# Patient Record
Sex: MALE | Race: ASIAN | URBAN - METROPOLITAN AREA
[De-identification: names, ages, dates, MRNs, and addresses within clinical notes are randomized per-mention and may not be internally consistent; named-entity substitution may affect disease eponyms.]

---

## 2020-04-08 ENCOUNTER — INPATIENT (INPATIENT)
Facility: HOSPITAL | Age: 75
LOS: 6 days | Discharge: HOME HEALTH | End: 2020-04-15
Attending: INTERNAL MEDICINE | Admitting: INTERNAL MEDICINE
Payer: MEDICARE

## 2020-04-08 VITALS
TEMPERATURE: 98 F | SYSTOLIC BLOOD PRESSURE: 91 MMHG | RESPIRATION RATE: 22 BRPM | OXYGEN SATURATION: 88 % | DIASTOLIC BLOOD PRESSURE: 63 MMHG | HEART RATE: 98 BPM

## 2020-04-08 DIAGNOSIS — R74.0 NONSPECIFIC ELEVATION OF LEVELS OF TRANSAMINASE AND LACTIC ACID DEHYDROGENASE [LDH]: ICD-10-CM

## 2020-04-08 LAB
ALBUMIN SERPL ELPH-MCNC: 3.7 G/DL — SIGNIFICANT CHANGE UP (ref 3.5–5.2)
ALP SERPL-CCNC: 42 U/L — SIGNIFICANT CHANGE UP (ref 30–115)
ALT FLD-CCNC: 41 U/L — SIGNIFICANT CHANGE UP (ref 0–41)
ANION GAP SERPL CALC-SCNC: 14 MMOL/L — SIGNIFICANT CHANGE UP (ref 7–14)
AST SERPL-CCNC: 59 U/L — HIGH (ref 0–41)
BASOPHILS # BLD AUTO: 0 K/UL — SIGNIFICANT CHANGE UP (ref 0–0.2)
BASOPHILS NFR BLD AUTO: 0 % — SIGNIFICANT CHANGE UP (ref 0–1)
BILIRUB SERPL-MCNC: 1.3 MG/DL — HIGH (ref 0.2–1.2)
BUN SERPL-MCNC: 23 MG/DL — HIGH (ref 10–20)
CALCIUM SERPL-MCNC: 8.1 MG/DL — LOW (ref 8.5–10.1)
CHLORIDE SERPL-SCNC: 80 MMOL/L — LOW (ref 98–110)
CO2 SERPL-SCNC: 28 MMOL/L — SIGNIFICANT CHANGE UP (ref 17–32)
CREAT SERPL-MCNC: 0.8 MG/DL — SIGNIFICANT CHANGE UP (ref 0.7–1.5)
D DIMER BLD IA.RAPID-MCNC: 229 NG/ML DDU — SIGNIFICANT CHANGE UP (ref 0–230)
EOSINOPHIL # BLD AUTO: 0 K/UL — SIGNIFICANT CHANGE UP (ref 0–0.7)
EOSINOPHIL NFR BLD AUTO: 0 % — SIGNIFICANT CHANGE UP (ref 0–8)
GLUCOSE SERPL-MCNC: 151 MG/DL — HIGH (ref 70–99)
HCT VFR BLD CALC: 39.5 % — LOW (ref 42–52)
HGB BLD-MCNC: 14 G/DL — SIGNIFICANT CHANGE UP (ref 14–18)
IMM GRANULOCYTES NFR BLD AUTO: 0.4 % — HIGH (ref 0.1–0.3)
INR BLD: 1.14 RATIO — SIGNIFICANT CHANGE UP (ref 0.65–1.3)
LDH SERPL L TO P-CCNC: 402 U/L — HIGH (ref 50–242)
LYMPHOCYTES # BLD AUTO: 0.26 K/UL — LOW (ref 1.2–3.4)
LYMPHOCYTES # BLD AUTO: 5.7 % — LOW (ref 20.5–51.1)
MCHC RBC-ENTMCNC: 32.6 PG — HIGH (ref 27–31)
MCHC RBC-ENTMCNC: 35.4 G/DL — SIGNIFICANT CHANGE UP (ref 32–37)
MCV RBC AUTO: 92.1 FL — SIGNIFICANT CHANGE UP (ref 80–94)
MONOCYTES # BLD AUTO: 0.47 K/UL — SIGNIFICANT CHANGE UP (ref 0.1–0.6)
MONOCYTES NFR BLD AUTO: 10.2 % — HIGH (ref 1.7–9.3)
NEUTROPHILS # BLD AUTO: 3.84 K/UL — SIGNIFICANT CHANGE UP (ref 1.4–6.5)
NEUTROPHILS NFR BLD AUTO: 83.7 % — HIGH (ref 42.2–75.2)
NRBC # BLD: 0 /100 WBCS — SIGNIFICANT CHANGE UP (ref 0–0)
PLATELET # BLD AUTO: 146 K/UL — SIGNIFICANT CHANGE UP (ref 130–400)
POTASSIUM SERPL-MCNC: 3.3 MMOL/L — LOW (ref 3.5–5)
POTASSIUM SERPL-SCNC: 3.3 MMOL/L — LOW (ref 3.5–5)
PROT SERPL-MCNC: 6.5 G/DL — SIGNIFICANT CHANGE UP (ref 6–8)
PROTHROM AB SERPL-ACNC: 13.1 SEC — HIGH (ref 9.95–12.87)
RBC # BLD: 4.29 M/UL — LOW (ref 4.7–6.1)
RBC # FLD: 11 % — LOW (ref 11.5–14.5)
SODIUM SERPL-SCNC: 122 MMOL/L — LOW (ref 135–146)
TROPONIN T SERPL-MCNC: <0.01 NG/ML — SIGNIFICANT CHANGE UP
WBC # BLD: 4.59 K/UL — LOW (ref 4.8–10.8)
WBC # FLD AUTO: 4.59 K/UL — LOW (ref 4.8–10.8)

## 2020-04-08 PROCEDURE — 99223 1ST HOSP IP/OBS HIGH 75: CPT | Mod: AI

## 2020-04-08 PROCEDURE — 93010 ELECTROCARDIOGRAM REPORT: CPT | Mod: 77

## 2020-04-08 PROCEDURE — 71045 X-RAY EXAM CHEST 1 VIEW: CPT | Mod: 26

## 2020-04-08 PROCEDURE — 70450 CT HEAD/BRAIN W/O DYE: CPT | Mod: 26

## 2020-04-08 PROCEDURE — 93010 ELECTROCARDIOGRAM REPORT: CPT

## 2020-04-08 PROCEDURE — 99285 EMERGENCY DEPT VISIT HI MDM: CPT

## 2020-04-08 RX ORDER — SODIUM CHLORIDE 9 MG/ML
1000 INJECTION INTRAMUSCULAR; INTRAVENOUS; SUBCUTANEOUS ONCE
Refills: 0 | Status: COMPLETED | OUTPATIENT
Start: 2020-04-08 | End: 2020-04-08

## 2020-04-08 RX ORDER — ACETAMINOPHEN 500 MG
650 TABLET ORAL EVERY 6 HOURS
Refills: 0 | Status: DISCONTINUED | OUTPATIENT
Start: 2020-04-08 | End: 2020-04-15

## 2020-04-08 RX ORDER — POTASSIUM CHLORIDE 20 MEQ
20 PACKET (EA) ORAL
Refills: 0 | Status: COMPLETED | OUTPATIENT
Start: 2020-04-08 | End: 2020-04-08

## 2020-04-08 RX ORDER — ENOXAPARIN SODIUM 100 MG/ML
40 INJECTION SUBCUTANEOUS AT BEDTIME
Refills: 0 | Status: DISCONTINUED | OUTPATIENT
Start: 2020-04-08 | End: 2020-04-15

## 2020-04-08 RX ORDER — SODIUM CHLORIDE 9 MG/ML
1000 INJECTION INTRAMUSCULAR; INTRAVENOUS; SUBCUTANEOUS
Refills: 0 | Status: DISCONTINUED | OUTPATIENT
Start: 2020-04-08 | End: 2020-04-15

## 2020-04-08 RX ORDER — POTASSIUM CHLORIDE 20 MEQ
40 PACKET (EA) ORAL ONCE
Refills: 0 | Status: COMPLETED | OUTPATIENT
Start: 2020-04-08 | End: 2020-04-08

## 2020-04-08 RX ORDER — SODIUM CHLORIDE 9 MG/ML
1000 INJECTION, SOLUTION INTRAVENOUS ONCE
Refills: 0 | Status: COMPLETED | OUTPATIENT
Start: 2020-04-08 | End: 2020-04-08

## 2020-04-08 RX ORDER — LOSARTAN POTASSIUM 100 MG/1
1 TABLET, FILM COATED ORAL
Qty: 0 | Refills: 0 | DISCHARGE

## 2020-04-08 RX ORDER — PANTOPRAZOLE SODIUM 20 MG/1
40 TABLET, DELAYED RELEASE ORAL
Refills: 0 | Status: DISCONTINUED | OUTPATIENT
Start: 2020-04-08 | End: 2020-04-15

## 2020-04-08 RX ADMIN — SODIUM CHLORIDE 1000 MILLILITER(S): 9 INJECTION, SOLUTION INTRAVENOUS at 12:00

## 2020-04-08 RX ADMIN — Medication 40 MILLIEQUIVALENT(S): at 14:13

## 2020-04-08 RX ADMIN — Medication 20 MILLIEQUIVALENT(S): at 22:20

## 2020-04-08 RX ADMIN — SODIUM CHLORIDE 1000 MILLILITER(S): 9 INJECTION INTRAMUSCULAR; INTRAVENOUS; SUBCUTANEOUS at 14:13

## 2020-04-08 RX ADMIN — Medication 650 MILLIGRAM(S): at 17:52

## 2020-04-08 RX ADMIN — SODIUM CHLORIDE 100 MILLILITER(S): 9 INJECTION INTRAMUSCULAR; INTRAVENOUS; SUBCUTANEOUS at 17:52

## 2020-04-08 RX ADMIN — Medication 20 MILLIEQUIVALENT(S): at 17:52

## 2020-04-08 RX ADMIN — ENOXAPARIN SODIUM 40 MILLIGRAM(S): 100 INJECTION SUBCUTANEOUS at 23:48

## 2020-04-08 NOTE — ED PROVIDER NOTE - CARE PLAN
Principal Discharge DX:	Syncope and collapse  Secondary Diagnosis:	Hypoxia  Secondary Diagnosis:	Malnutrition
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
## 2020-04-08 NOTE — ED PROVIDER NOTE - CLINICAL SUMMARY MEDICAL DECISION MAKING FREE TEXT BOX
Attending Note:   73 yo M presents c/o syncope, decreased PO intake x2 weeks along with cough, SOB and concern for COVID. Physical-nad,perrl,mmm,rrr,ctab,abd softntnd,fromx4,anox3. Initial VS within normal limits, mild tachypnea at 22, sat 88% on room air. Review of labs noted to have lymphocytopenia, mildly elevated transaminitis, CXR with R middle love opacity, EKG with R bundle branch block. Will admit for syncope. Eval and monitor for hypoxia for COVID, placed on O2 2L on nasal cannula. Currently not tachypneic or on respiratory distress. Will admit to the floor.

## 2020-04-08 NOTE — H&P ADULT - HISTORY OF PRESENT ILLNESS
Patient is a 74 year old Male with PMH HTN comes in with Decreased PO intake and Syncope with fall. Patient reports two weeks of Decreased PO intake due to Decreased Appetite. Patient reports that he tries to eat things like oatmeal and banana but he cant finish it due to no appetite. He repots Malaise, weakness, and loss of weight, unable to quantify. this morning he was walking to bathroom, fell, and cut his head, not sure how +LOC. Patient reports associated fevers of "107 degrees" but that has since resolved.  He denies any Abdominal pain, Nausea, Vomiting. he does report Loose stools and attributes it to not eating much. He reports no blood, but Reports "Some Black dots" in his loose stool. No change in Urination.  He reports that his doctor prescribed Robitussin, but he has since stopped that medication.   The patient reports that he has had a very mild cough. He reports his wife has been coughing a lot, but his is only mild.    Patient reports no Travel History, And only possible sick contact with wife.     In ED PT was Afebrile, Hypotensive, and Tachycardic - Treated with 2 L IV NSS.

## 2020-04-08 NOTE — H&P ADULT - NSHPLABSRESULTS_GEN_ALL_CORE
14.0   4.59  )-----------( 146      ( 08 Apr 2020 11:38 )             39.5     Auto Lymphocyte # : 0.26 K/uL    04-08    122<L>  |  80<L>  |  23<H>  ----------------------------<  151<H>  3.3<L>   |  28  |  0.8    Ca    8.1<L>      08 Apr 2020 11:38    TPro  6.5  /  Alb  3.7  /  TBili  1.3<H>  /  DBili  x   /  AST  59<H>  /  ALT  41  /  AlkPhos  42  04-08    Lactate Dehydrogenase, Serum (04.08.20 @ 11:38)    Lactate Dehydrogenase, Serum: 402 U/L    CARDIAC MARKERS ( 08 Apr 2020 11:38 )  x     / <0.01 ng/mL / x     / x     / x        < from: Xray Chest 1 View-PORTABLE IMMEDIATE (04.08.20 @ 13:22) >      Impression:      Right midlung field opacification.    < end of copied text >    < from: CT Head No Cont (04.08.20 @ 12:09) >    IMPRESSION:     No evidence of acute intracranial pathology.      < end of copied text >

## 2020-04-08 NOTE — ED PROVIDER NOTE - PHYSICAL EXAMINATION
CONST: NAD  EYES: Sclera and conjunctiva clear.   ENT: Clear nasal discharge. Oropharynx normal appearing, no erythema or exudates. No abscess or swelling. Uvula midline.   NECK: Non-tender, no meningeal signs. normal ROM. supple   CARD: S1 S2; No jvd  RESP: B/L crackles. O2sat 88% ra, improve to 95% 2L nc  GI: Soft, non-tender, non-distended. no cva tenderness. normal BS  MS: Normal ROM in all extremities. pulses 2 +. no calf tenderness or swelling  SKIN: scalp abrasion  NEURO: A&Ox4, No focal deficits. Strength 5/5 with no sensory deficits. Finger to nose intact. Negative pronator drift

## 2020-04-08 NOTE — H&P ADULT - NSICDXNOPASTSURGICALHX_GEN_ALL_CORE
Problem: Fall Risk (Adult)  Goal: Identify Related Risk Factors and Signs and Symptoms  Outcome: Ongoing (interventions implemented as appropriate)    Goal: Absence of Fall  Outcome: Ongoing (interventions implemented as appropriate)      Problem: Confusion, Acute (Adult)  Goal: Identify Related Risk Factors and Signs and Symptoms  Outcome: Ongoing (interventions implemented as appropriate)    Goal: Cognitive/Functional Impairments Minimized  Outcome: Ongoing (interventions implemented as appropriate)    Goal: Safety  Outcome: Ongoing (interventions implemented as appropriate)      Problem: Patient Care Overview  Goal: Plan of Care Review  Outcome: Ongoing (interventions implemented as appropriate)   07/16/19 0644   Coping/Psychosocial   Plan of Care Reviewed With patient   Plan of Care Review   Progress no change   OTHER   Outcome Summary VSS, except for increased bp at times. No c/o pain voiced. No hallucinations/seizure activity noted. Pt is alert, w/confusion noted, became more focused and alert as the shift progressed. Pt is impulsive at times, did not put SCD on, instructed on ankle pumps. Safety maintained.       Problem: Skin Injury Risk (Adult)  Goal: Identify Related Risk Factors and Signs and Symptoms  Outcome: Ongoing (interventions implemented as appropriate)    Goal: Skin Health and Integrity  Outcome: Ongoing (interventions implemented as appropriate)         <-- Click to add NO significant Past Surgical History

## 2020-04-08 NOTE — H&P ADULT - ASSESSMENT
Patient is a 74 year old Male with PMH HTN comes in with Decreased PO intake and Syncope with fall. Patient reports 2 weeks no Appetite, this morning fell while walking to bathroom.     # Syncope - Likely due to hypovolemia secondary to Decreased PO intake likely secondary to Viral illness  - Afebrile, Hypotensive, Tachycardic  - No Chest pain, Palpitations, Cardiac history - Negative Trops  - EKG pending  - Na - 122 K 3.3  - CTH - Negative  - S/P 2 L Fluid - Continue with NSS @ 100  - Consider GI work up - Pending COVID status if patient does not improve -    # Cough   - Lab values with CXR- opacification R. Lung field and possible sick contact  - COVID swabbed     # Hyponatremia - NSS @ 100  # Hypokalemia - Replete 20 mg 3 doses   # HTN - Currently Hypotensive - Hold Losartan (Did not know his pharmacy - Somewhere in [place])    Diet: DASH  DVT ppx: Lovenox 40   GI ppx: Protonix - Given Protonix for Decreased Appetite   Activity: Ambulate as tolerated  Dispo: Acute; Pending Covid swab; Consider GI work up   FULL CODE
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
## 2020-04-08 NOTE — H&P ADULT - NSHPPHYSICALEXAM_GEN_ALL_CORE
VITAL SIGNS: AFebrile, vital signs stable  CONSTITUTIONAL: Well-developed; slightly Cachetic  SKIN: Skin exam is warm and dry, no acute rash.  HEAD: Normocephalic; Superficial Laceration over right Parietal Bone  EYES: Pupils equal round reactive to light, Extraocular movements intact; conjunctiva and sclera clear. No Icterus  NECK: Supple; non tender.  CARD:  Normal S1, S2; Regular rate and rhythm. no murmurs, gallops, or rubs. Appreciated  RESP: Lungs clear to auscultation bilaterally. No wheezes, rales or rhonchi.  ABD: Abdomen soft; non-tender, even on Deep palpation; non-distended;  no hepatosplenomegaly. No costovertebral angle tenderness. NO MASS APPRECIATED  EXT: Normal ROM. No clubbing, cyanosis or edema. No calf tenderness to palpation.  NEURO: Alert and oriented x 3. No focal deficits.  PSYCH: Cooperative, appropriate.

## 2020-04-08 NOTE — ED ADULT NURSE NOTE - NSIMPLEMENTINTERV_GEN_ALL_ED
Implemented All Fall Risk Interventions:  [place] to call system. Call bell, personal items and telephone within reach. Instruct patient to call for assistance. Room bathroom lighting operational. Non-slip footwear when patient is off stretcher. Physically safe environment: no spills, clutter or unnecessary equipment. Stretcher in lowest position, wheels locked, appropriate side rails in place. Provide visual cue, wrist band, yellow gown, etc. Monitor gait and stability. Monitor for mental status changes and reorient to person, place, and time. Review medications for side effects contributing to fall risk. Reinforce activity limits and safety measures with patient and family.

## 2020-04-08 NOTE — ED PROVIDER NOTE - NS ED ROS FT
Constitutional: (+) fever  Eyes/ENT: (-) blurry vision, (-) epistaxis  Cardiovascular: (+) syncope, (-) cp  Respiratory: (+) cough, (+) shortness of breath  Gastrointestinal: (-) vomiting, (-) diarrhea  : (-) dysuria, (-) hematuria  Musculoskeletal: (-) neck pain, (-) back pain, (-) joint pain  Integumentary: (-) rash, (-) edema  Neurological: (-) headache, (-) altered mental status  Allergic/Immunologic: (-) pruritus

## 2020-04-08 NOTE — H&P ADULT - NSHPREVIEWOFSYSTEMS_GEN_ALL_CORE
Review of Systems:  CONSTITUTIONAL - Fever, Chills (In legs) and Loss of weight  SKIN - No rash  EYES - No eye pain, No blurred vision  ENT - Reports no Throat pain  RESPIRATORY - Mild SOB and Mild Cough  CARDIAC -No chest pain, No palpitations  GI - Loose stools with "Black dots"  No abdominal pain, No nausea, No vomiting, No constipation, No bright red blood per rectum   - No dysuria, frequency, hematuria.   MUSCULOSKELETAL - Overall Malaise  NEUROLOGIC - Patient reports dizziness with walking. No numbness, No focal weakness, No headache,

## 2020-04-08 NOTE — H&P ADULT - NSHPSOCIALHISTORY_GEN_ALL_CORE
Patient lives at Home with Wife. (Wife is also A rule out covid in the hospital. Wife works as )    Patient Born in [facility]. Moved to [facility]. Worked in [employer]  Patient reports No family history of Malignancy only HTN    Patient reports smoking for 2 months 30 years ago,   Patient Reports no Alcohol or Illicit Drugs.     Patients reports being fully functional Prior to these events over the past 2 weeks.

## 2020-04-09 LAB
ALBUMIN SERPL ELPH-MCNC: 3.5 G/DL — SIGNIFICANT CHANGE UP (ref 3.5–5.2)
ALP SERPL-CCNC: 42 U/L — SIGNIFICANT CHANGE UP (ref 30–115)
ALT FLD-CCNC: 43 U/L — HIGH (ref 0–41)
ANION GAP SERPL CALC-SCNC: 11 MMOL/L — SIGNIFICANT CHANGE UP (ref 7–14)
AST SERPL-CCNC: 51 U/L — HIGH (ref 0–41)
BASOPHILS # BLD AUTO: 0 K/UL — SIGNIFICANT CHANGE UP (ref 0–0.2)
BASOPHILS NFR BLD AUTO: 0 % — SIGNIFICANT CHANGE UP (ref 0–1)
BILIRUB SERPL-MCNC: 1.1 MG/DL — SIGNIFICANT CHANGE UP (ref 0.2–1.2)
BUN SERPL-MCNC: 18 MG/DL — SIGNIFICANT CHANGE UP (ref 10–20)
CALCIUM SERPL-MCNC: 7.9 MG/DL — LOW (ref 8.5–10.1)
CHLORIDE SERPL-SCNC: 95 MMOL/L — LOW (ref 98–110)
CO2 SERPL-SCNC: 25 MMOL/L — SIGNIFICANT CHANGE UP (ref 17–32)
CREAT SERPL-MCNC: 0.6 MG/DL — LOW (ref 0.7–1.5)
EOSINOPHIL # BLD AUTO: 0 K/UL — SIGNIFICANT CHANGE UP (ref 0–0.7)
EOSINOPHIL NFR BLD AUTO: 0 % — SIGNIFICANT CHANGE UP (ref 0–8)
GLUCOSE SERPL-MCNC: 129 MG/DL — HIGH (ref 70–99)
HCT VFR BLD CALC: 40.1 % — LOW (ref 42–52)
HCV AB S/CO SERPL IA: 0.03 COI — SIGNIFICANT CHANGE UP
HCV AB SERPL-IMP: SIGNIFICANT CHANGE UP
HGB BLD-MCNC: 13.9 G/DL — LOW (ref 14–18)
IMM GRANULOCYTES NFR BLD AUTO: 0.5 % — HIGH (ref 0.1–0.3)
LYMPHOCYTES # BLD AUTO: 0.46 K/UL — LOW (ref 1.2–3.4)
LYMPHOCYTES # BLD AUTO: 7.6 % — LOW (ref 20.5–51.1)
MAGNESIUM SERPL-MCNC: 2.3 MG/DL — SIGNIFICANT CHANGE UP (ref 1.8–2.4)
MCHC RBC-ENTMCNC: 32.9 PG — HIGH (ref 27–31)
MCHC RBC-ENTMCNC: 34.7 G/DL — SIGNIFICANT CHANGE UP (ref 32–37)
MCV RBC AUTO: 95 FL — HIGH (ref 80–94)
MONOCYTES # BLD AUTO: 0.33 K/UL — SIGNIFICANT CHANGE UP (ref 0.1–0.6)
MONOCYTES NFR BLD AUTO: 5.4 % — SIGNIFICANT CHANGE UP (ref 1.7–9.3)
NEUTROPHILS # BLD AUTO: 5.25 K/UL — SIGNIFICANT CHANGE UP (ref 1.4–6.5)
NEUTROPHILS NFR BLD AUTO: 86.5 % — HIGH (ref 42.2–75.2)
NRBC # BLD: 0 /100 WBCS — SIGNIFICANT CHANGE UP (ref 0–0)
NT-PROBNP SERPL-SCNC: 282 PG/ML — SIGNIFICANT CHANGE UP (ref 0–300)
PLATELET # BLD AUTO: 181 K/UL — SIGNIFICANT CHANGE UP (ref 130–400)
POTASSIUM SERPL-MCNC: 3.9 MMOL/L — SIGNIFICANT CHANGE UP (ref 3.5–5)
POTASSIUM SERPL-SCNC: 3.9 MMOL/L — SIGNIFICANT CHANGE UP (ref 3.5–5)
PROT SERPL-MCNC: 6.1 G/DL — SIGNIFICANT CHANGE UP (ref 6–8)
RBC # BLD: 4.22 M/UL — LOW (ref 4.7–6.1)
RBC # FLD: 11.4 % — LOW (ref 11.5–14.5)
SARS-COV-2 RNA SPEC QL NAA+PROBE: DETECTED
SODIUM SERPL-SCNC: 131 MMOL/L — LOW (ref 135–146)
WBC # BLD: 6.07 K/UL — SIGNIFICANT CHANGE UP (ref 4.8–10.8)
WBC # FLD AUTO: 6.07 K/UL — SIGNIFICANT CHANGE UP (ref 4.8–10.8)

## 2020-04-09 PROCEDURE — 99233 SBSQ HOSP IP/OBS HIGH 50: CPT

## 2020-04-09 RX ADMIN — PANTOPRAZOLE SODIUM 40 MILLIGRAM(S): 20 TABLET, DELAYED RELEASE ORAL at 06:08

## 2020-04-09 RX ADMIN — ENOXAPARIN SODIUM 40 MILLIGRAM(S): 100 INJECTION SUBCUTANEOUS at 22:35

## 2020-04-09 NOTE — PROGRESS NOTE ADULT - ASSESSMENT
a/p:  #syncope- 2/2 dehydration/hypovolemia- +Covid viral illness  -CT head negative  -COVID19+   -monitor bmp (Na 131 today)--continue IVF and if continues to have good oral intake can stop ivf  -check orthostatics  -continue isolation precautions  -trend procalcitonin, crp and ddimer---all pending from this morning  -monitor o2 sat---currently no oxygen needs and o2sat 96-97% on RA  -monitor wbc and fever curve    #HTN  -bp stable off anti-hypertensives  -monitor bp---depending on orthostatic changes if negative will restart    #DVT/GI ppx  guarded prognosis    #Progress Note Handoff  Disposition: Home when clinically improved (lives with wife and family however wife is also admitted at [facility])

## 2020-04-09 NOTE — PROGRESS NOTE ADULT - SUBJECTIVE AND OBJECTIVE BOX
Patient is a 74y old  Male who presents with a chief complaint of Syncope, FAll (09 Apr 2020 09:47)    HPI:  Patient is a 74 year old Male with PMH HTN comes in with Decreased PO intake and Syncope with fall. Patient reports two weeks of Decreased PO intake due to Decreased Appetite. Patient reports that he tries to eat things like oatmeal and banana but he cant finish it due to no appetite. He repots Malaise, weakness, and loss of weight, unable to quantify. this morning he was walking to bathroom, fell, and cut his head, not sure how +LOC. Patient reports associated fevers of "107 degrees" but that has since resolved.  He denies any Abdominal pain, Nausea, Vomiting. he does report Loose stools and attributes it to not eating much. He reports no blood, but Reports "Some Black dots" in his loose stool. No change in Urination.  He reports that his doctor prescribed Robitussin, but he has since stopped that medication.   The patient reports that he has had a very mild cough. He reports his wife has been coughing a lot, but his is only mild.    Patient reports no Travel History, And only possible sick contact with wife.     In ED PT was Afebrile, Hypotensive, and Tachycardic - Treated with 2 L IV NSS. (08 Apr 2020 15:02)    PAST MEDICAL & SURGICAL HISTORY:  Hypertension, unspecified type  No significant past surgical history    patient seen and examined independently on morning rounds for the first time today, chart reviewed and discussed with the medicine resident    no overnight events--ate breakfast this morning- covid +    Vital Signs Last 24 Hrs  T(C): 37.4 (09 Apr 2020 06:20), Max: 38.4 (08 Apr 2020 17:44)  T(F): 99.3 (09 Apr 2020 06:20), Max: 101.2 (08 Apr 2020 17:44)  HR: 81 (09 Apr 2020 06:20) (72 - 115)  BP: 118/69 (09 Apr 2020 06:20) (98/66 - 133/77)  BP(mean): --  RR: 18 (08 Apr 2020 21:00) (18 - 18)  SpO2: 97% (08 Apr 2020 20:40) (92% - 97%)             PE:  GEN-NAD, AAOx3  PULM- Clear to auscultation bilaterally, fair air entry  CVS- +s1/s2 RRR   GI- soft NT ND +bs, no rebound, no guarding  EXT- no edema               13.9   6.07  )-----------( 181      ( 09 Apr 2020 06:53 )             40.1     04-09    131<L>  |  95<L>  |  18  ----------------------------<  129<H>  3.9   |  25  |  0.6<L>    Ca    7.9<L>      09 Apr 2020 06:53  Mg     2.3     04-09    TPro  6.1  /  Alb  3.5  /  TBili  1.1  /  DBili  x   /  AST  51<H>  /  ALT  43<H>  /  AlkPhos  42  04-09    CARDIAC MARKERS ( 08 Apr 2020 11:38 )  x     / <0.01 ng/mL / x     / x     / x                MEDICATIONS  (STANDING):  enoxaparin Injectable 40 milliGRAM(s) SubCutaneous at bedtime  pantoprazole    Tablet 40 milliGRAM(s) Oral before breakfast  sodium chloride 0.9%. 1000 milliLiter(s) (100 mL/Hr) IV Continuous <Continuous>

## 2020-04-09 NOTE — PROGRESS NOTE ADULT - SUBJECTIVE AND OBJECTIVE BOX
24H events:    Patient is a 74y old Male who presents with a chief complaint of Syncope, FAll (08 Apr 2020 15:02)    Primary diagnosis of Syncope and collapse     Today is hospital day 1d.     PAST MEDICAL & SURGICAL HISTORY  Hypertension, unspecified type  No significant past surgical history    SOCIAL HISTORY:  Negative for smoking/alcohol/drug use.     ALLERGIES:  No Known Allergies    MEDICATIONS:  STANDING MEDICATIONS  enoxaparin Injectable 40 milliGRAM(s) SubCutaneous at bedtime  pantoprazole    Tablet 40 milliGRAM(s) Oral before breakfast  sodium chloride 0.9%. 1000 milliLiter(s) IV Continuous <Continuous>    PRN MEDICATIONS  acetaminophen   Tablet .. 650 milliGRAM(s) Oral every 6 hours PRN    VITALS:   T(F): 99.3  HR: 81  BP: 118/69  RR: 18  SpO2: 97%    LABS:                        13.9   6.07  )-----------( 181      ( 09 Apr 2020 06:53 )             40.1     04-09    131<L>  |  95<L>  |  18  ----------------------------<  129<H>  3.9   |  25  |  0.6<L>    Ca    7.9<L>      09 Apr 2020 06:53  Mg     2.3     04-09    TPro  6.1  /  Alb  3.5  /  TBili  1.1  /  DBili  x   /  AST  51<H>  /  ALT  43<H>  /  AlkPhos  42  04-09    PT/INR - ( 08 Apr 2020 11:38 )   PT: 13.10 sec;   INR: 1.14 ratio               Troponin T, Serum: <0.01 ng/mL (04-08-20 @ 11:38)      CARDIAC MARKERS ( 08 Apr 2020 11:38 )  x     / <0.01 ng/mL / x     / x     / x            PHYSICAL EXAM:  CONSTITUTIONAL: Well-developed; slightly Cachetic  SKIN: Skin exam is warm and dry, no acute rash.  HEAD: Normocephalic; Superficial Laceration over right Parietal Bone  EYES: Pupils equal round reactive to light, Extraocular movements intact; conjunctiva and sclera clear. No Icterus  NECK: Supple; non tender.  CARD:  Normal S1, S2; Regular rate and rhythm. no murmurs, gallops, or rubs. Appreciated  RESP: Lungs clear to auscultation bilaterally. No wheezes, rales or rhonchi.  ABD: Abdomen soft; non-tender, even on Deep palpation; non-distended;  no hepatosplenomegaly. No costovertebral angle tenderness. NO MASS APPRECIATED

## 2020-04-09 NOTE — PROGRESS NOTE ADULT - ASSESSMENT
Patient is a 74 year old Male with PMH HTN comes in with Decreased PO intake and Syncope with fall. Patient reports 2 weeks no Appetite, this morning fell while walking to bathroom.     # Syncope - Likely due to hypovolemia secondary to Decreased PO intake likely secondary to Viral illness  - Afebrile, Hypotensive, Tachycardic  - No Chest pain, Palpitations, Cardiac history - Negative Trops  - EKG pending  - Na was low. Mostly secondary to dehydration. Repeat is still low but improving the patient is on NS 0.9% 100 cc/ hours  - CTH - Negative  - Consider GI work up - Pending COVID status if patient does not improve -    # Cough   - Lab values with CXR- opacification R. Lung field and possible sick contact  - COVID +Ve    # Hyponatremia - NSS @ 100cc/ hour  # Hypokalemia - Replete 20 mg 3 doses   # HTN - BP medication were held as of Hypotension     Diet: DASH  DVT ppx: Lovenox 40   GI ppx: Protonix - Given Protonix for Decreased Appetite   Activity: Ambulate as tolerated  Dispo: Acute  FULL CODE

## 2020-04-10 LAB
ALBUMIN SERPL ELPH-MCNC: 3.3 G/DL — LOW (ref 3.5–5.2)
ALP SERPL-CCNC: 43 U/L — SIGNIFICANT CHANGE UP (ref 30–115)
ALT FLD-CCNC: 52 U/L — HIGH (ref 0–41)
ANION GAP SERPL CALC-SCNC: 13 MMOL/L — SIGNIFICANT CHANGE UP (ref 7–14)
AST SERPL-CCNC: 44 U/L — HIGH (ref 0–41)
BILIRUB SERPL-MCNC: 1.1 MG/DL — SIGNIFICANT CHANGE UP (ref 0.2–1.2)
BUN SERPL-MCNC: 21 MG/DL — HIGH (ref 10–20)
CALCIUM SERPL-MCNC: 8.1 MG/DL — LOW (ref 8.5–10.1)
CHLORIDE SERPL-SCNC: 93 MMOL/L — LOW (ref 98–110)
CO2 SERPL-SCNC: 25 MMOL/L — SIGNIFICANT CHANGE UP (ref 17–32)
CREAT SERPL-MCNC: 0.7 MG/DL — SIGNIFICANT CHANGE UP (ref 0.7–1.5)
CRP SERPL-MCNC: 6.95 MG/DL — HIGH (ref 0–0.4)
FERRITIN SERPL-MCNC: 582 NG/ML — HIGH (ref 30–400)
GLUCOSE SERPL-MCNC: 132 MG/DL — HIGH (ref 70–99)
HCT VFR BLD CALC: 39.8 % — LOW (ref 42–52)
HGB BLD-MCNC: 14.2 G/DL — SIGNIFICANT CHANGE UP (ref 14–18)
MCHC RBC-ENTMCNC: 34.5 PG — HIGH (ref 27–31)
MCHC RBC-ENTMCNC: 35.7 G/DL — SIGNIFICANT CHANGE UP (ref 32–37)
MCV RBC AUTO: 96.6 FL — HIGH (ref 80–94)
NRBC # BLD: 0 /100 WBCS — SIGNIFICANT CHANGE UP (ref 0–0)
PLATELET # BLD AUTO: 193 K/UL — SIGNIFICANT CHANGE UP (ref 130–400)
POTASSIUM SERPL-MCNC: 3.5 MMOL/L — SIGNIFICANT CHANGE UP (ref 3.5–5)
POTASSIUM SERPL-SCNC: 3.5 MMOL/L — SIGNIFICANT CHANGE UP (ref 3.5–5)
PROCALCITONIN SERPL-MCNC: 0.07 NG/ML — SIGNIFICANT CHANGE UP (ref 0.02–0.1)
PROT SERPL-MCNC: 6.1 G/DL — SIGNIFICANT CHANGE UP (ref 6–8)
RBC # BLD: 4.12 M/UL — LOW (ref 4.7–6.1)
RBC # FLD: 11.5 % — SIGNIFICANT CHANGE UP (ref 11.5–14.5)
SODIUM SERPL-SCNC: 131 MMOL/L — LOW (ref 135–146)
WBC # BLD: 5.3 K/UL — SIGNIFICANT CHANGE UP (ref 4.8–10.8)
WBC # FLD AUTO: 5.3 K/UL — SIGNIFICANT CHANGE UP (ref 4.8–10.8)

## 2020-04-10 PROCEDURE — 99232 SBSQ HOSP IP/OBS MODERATE 35: CPT

## 2020-04-10 PROCEDURE — 71045 X-RAY EXAM CHEST 1 VIEW: CPT | Mod: 26

## 2020-04-10 RX ADMIN — ENOXAPARIN SODIUM 40 MILLIGRAM(S): 100 INJECTION SUBCUTANEOUS at 20:58

## 2020-04-10 RX ADMIN — PANTOPRAZOLE SODIUM 40 MILLIGRAM(S): 20 TABLET, DELAYED RELEASE ORAL at 05:39

## 2020-04-10 RX ADMIN — Medication 650 MILLIGRAM(S): at 20:58

## 2020-04-10 NOTE — DISCHARGE NOTE PROVIDER - HOSPITAL COURSE
Patient is a 74 year old Male with PMH HTN comes in with Decreased PO intake and Syncope with fall. Patient reports 2 weeks no Appetite, this morning fell while walking to bathroom.         # Syncope - Likely due to hypovolemia secondary to Decreased PO intake likely secondary to Viral illness    - Afebrile, Hypotensive, Tachycardic    - No Chest pain, Palpitations, Cardiac history - Negative Trops    - EKG pending    - Na was low. Mostly secondary to dehydration. Repeat is still low but improving the patient is on NS 0.9% 100 cc/ hours    - CTH - Negative    - Consider GI work up - Pending COVID status if patient does not improve -        # Cough     - Lab values with CXR- opacification R. Lung field and possible sick contact    - COVID +Ve        # Hyponatremia - NSS @ 100cc/ hour    # Hypokalemia - Replete 20 mg 3 doses     # HTN - BP medication were held as of Hypotension         Diet: DASH    DVT ppx: Lovenox 40     GI ppx: Protonix - Given Protonix for Decreased Appetite     Activity: Ambulate as tolerated    Dispo: Acute    FULL CODE Patient is a 74 year old Male with PMH HTN comes in with Decreased PO intake and Syncope with fall. Patient reports 2 weeks no Appetite, this morning fell while walking to bathroom.         # Syncope - Likely due to hypovolemia secondary to Decreased PO intake likely secondary to Viral illness    - Afebrile, Hypotensive, Tachycardic    - No Chest pain, Palpitations, Cardiac history - Negative Trops    - EKG pending    - Na was low. Mostly secondary to dehydration. Repeat is still low but improving the patient is on NS 0.9% 100 cc/ hours    - CTH - Negative    - Consider GI work up - Pending COVID status if patient does not improve -        # Cough     - Lab values with CXR- opacification R. Lung field and possible sick contact    - COVID +Ve        The patient Spo2 is 92-93% on RA improves to 95-96% on 2 L however it drops to 85-86% with minimal ambulation. The patient need to be discharged on Home O2        # Hyponatremia - Repleted     # Hypokalemia - Repleted     # HTN - BP is within NL limits         Diet: DASH    DVT ppx: Lovenox 40     GI ppx: Protonix - Given Protonix for Decreased Appetite     Activity: Ambulate as tolerated    Dispo: Acute    FULL CODE Patient is a 74 year old Male with PMH HTN comes in with Decreased PO intake and Syncope with fall. Patient reports 2 weeks no Appetite, this morning fell while walking to bathroom.         # Syncope - Likely due to hypovolemia secondary to Decreased PO intake likely secondary to Viral illness    - initially Afebrile, Hypotensive, Tachycardic    - No Chest pain, Palpitations, Cardiac history - Negative Trops    - EKG pending    - Na was low. Mostly secondary to dehydration.      - CTH - Negative            # Cough     - Lab values with CXR- opacification R. Lung field and possible sick contact    - COVID +Ve        The patient Spo2 is 92-93% on RA improves to 95-96% on 2 L however it drops to 85-86% with minimal ambulation. The patient need to be discharged on Home O2        # Hyponatremia - Repleted     # Hypokalemia - Repleted     # HTN - BP is within NL limits         Diet: DASH    DVT ppx: Lovenox 40     GI ppx: Protonix - Given Protonix for Decreased Appetite     Activity: Ambulate as tolerated    Dispo: Acute    FULL CODE

## 2020-04-10 NOTE — PROGRESS NOTE ADULT - ASSESSMENT
a/p:  #syncope- 2/2 dehydration/hypovolemia- +Covid viral illness  -CT head negative  -COVID19+   -monitor bmp (Na 131 yesterday)---off ivf and tolerating oral intake- repeat bmp pending today   -check orthostatics  -continue isolation precautions  -crp 6.96/ procalc 0.07  -monitor o2 sat---currently no oxygen needs and o2sat 96-97% on RA  -monitor wbc and fever curve  -QTc >500---will repeat cxr and if any new infiltrates or respiratory symptoms will start HCQ and get ID consult    #HTN  -bp stable off anti-hypertensives  -monitor bp---depending on orthostatic changes if negative will restart    #DVT/GI ppx  guarded prognosis    #Progress Note Handoff  Disposition: anticipate likely dc home in next 24-48 hrs if no change clinically, able to tolerate oral intake and hyponatremia improved (concerned as patients wife is admitted to [id number] service for acute hypoxic respiratory failure)

## 2020-04-10 NOTE — PROGRESS NOTE ADULT - SUBJECTIVE AND OBJECTIVE BOX
Patient is a 74y old  Male who presents with a chief complaint of Syncope, FAll (09 Apr 2020 09:47)    HPI:  Patient is a 74 year old Male with PMH HTN comes in with Decreased PO intake and Syncope with fall. Patient reports two weeks of Decreased PO intake due to Decreased Appetite. Patient reports that he tries to eat things like oatmeal and banana but he cant finish it due to no appetite. He repots Malaise, weakness, and loss of weight, unable to quantify. this morning he was walking to bathroom, fell, and cut his head, not sure how +LOC. Patient reports associated fevers of "107 degrees" but that has since resolved.  He denies any Abdominal pain, Nausea, Vomiting. he does report Loose stools and attributes it to not eating much. He reports no blood, but Reports "Some Black dots" in his loose stool. No change in Urination.  He reports that his doctor prescribed Robitussin, but he has since stopped that medication.   The patient reports that he has had a very mild cough. He reports his wife has been coughing a lot, but his is only mild.    Patient reports no Travel History, And only possible sick contact with wife.     In ED PT was Afebrile, Hypotensive, and Tachycardic - Treated with 2 L IV NSS. (08 Apr 2020 15:02)    PAST MEDICAL & SURGICAL HISTORY:  Hypertension, unspecified type  No significant past surgical history    patient seen and examined independently on rounds, chart reviewed and d/w medicine resident.    no overnight events--concerned about wife who is admitted on 3B unit with +covid- no dizziness- no sob or chest pain               PE:  GEN-NAD, AAOx3  PULM- Clear to auscultation bilaterally, fair air entry  CVS- +s1/s2 RRR   GI- soft NT ND +bs, no rebound, no guarding  EXT- no edema         Labs:                        13.9   6.07  )-----------( 181      ( 09 Apr 2020 06:53 )             40.1     CBC Full  -  ( 09 Apr 2020 06:53 )  WBC Count : 6.07 K/uL  RBC Count : 4.22 M/uL  Hemoglobin : 13.9 g/dL  Hematocrit : 40.1 %  Platelet Count - Automated : 181 K/uL  Mean Cell Volume : 95.0 fL  Mean Cell Hemoglobin : 32.9 pg  Mean Cell Hemoglobin Concentration : 34.7 g/dL  Auto Neutrophil # : 5.25 K/uL  Auto Lymphocyte # : 0.46 K/uL  Auto Monocyte # : 0.33 K/uL  Auto Eosinophil # : 0.00 K/uL  Auto Basophil # : 0.00 K/uL  Auto Neutrophil % : 86.5 %  Auto Lymphocyte % : 7.6 %  Auto Monocyte % : 5.4 %  Auto Eosinophil % : 0.0 %  Auto Basophil % : 0.0 %      04-09    131<L>  |  95<L>  |  18  ----------------------------<  129<H>  3.9   |  25  |  0.6<L>    Ca    7.9<L>      09 Apr 2020 06:53  Mg     2.3     04-09    TPro  6.1  /  Alb  3.5  /  TBili  1.1  /  DBili  x   /  AST  51<H>  /  ALT  43<H>  /  AlkPhos  42  04-09      Microbiology:      Vital Signs Last 24 Hrs  T(C): 37.2 (10 Apr 2020 05:52), Max: 38.5 (09 Apr 2020 13:00)  T(F): 99 (10 Apr 2020 05:52), Max: 101.3 (09 Apr 2020 13:00)  HR: 71 (10 Apr 2020 05:52) (71 - 82)  BP: 120/71 (10 Apr 2020 05:52) (120/71 - 130/71)  BP(mean): --  RR: 18 (10 Apr 2020 05:52) (18 - 18)  SpO2: 94% (10 Apr 2020 09:25) (93% - 94%)    I&O's Summary          MEDICATIONS  (STANDING):  enoxaparin Injectable 40 milliGRAM(s) SubCutaneous at bedtime  pantoprazole    Tablet 40 milliGRAM(s) Oral before breakfast  sodium chloride 0.9%. 1000 milliLiter(s) (100 mL/Hr) IV Continuous <Continuous>

## 2020-04-10 NOTE — DISCHARGE NOTE PROVIDER - NSDCCPCAREPLAN_GEN_ALL_CORE_FT
PRINCIPAL DISCHARGE DIAGNOSIS  Diagnosis: Syncope and collapse  Assessment and Plan of Treatment: You peresented to the hospital after you fell down at home. you were complaining of decreased oral intake which lead to your dehydration. You were admitted to the hospital and you were tested positive for COVID. You however were not complaining of any Shortness of breath or cough. You recieved IV fluid to replace what you havent been eating. You improved and started to eat again. You are medically stable so we are dischrging you home. If you developed any Shortness of breath or any serious symptoms please call 911 or present yourself to the nearest ED      SECONDARY DISCHARGE DIAGNOSES  Diagnosis: Malnutrition  Assessment and Plan of Treatment:     Diagnosis: Hypoxia  Assessment and Plan of Treatment:

## 2020-04-11 LAB
ANION GAP SERPL CALC-SCNC: 13 MMOL/L — SIGNIFICANT CHANGE UP (ref 7–14)
BASOPHILS # BLD AUTO: 0.01 K/UL — SIGNIFICANT CHANGE UP (ref 0–0.2)
BASOPHILS NFR BLD AUTO: 0.2 % — SIGNIFICANT CHANGE UP (ref 0–1)
BUN SERPL-MCNC: 21 MG/DL — HIGH (ref 10–20)
CALCIUM SERPL-MCNC: 8 MG/DL — LOW (ref 8.5–10.1)
CHLORIDE SERPL-SCNC: 95 MMOL/L — LOW (ref 98–110)
CO2 SERPL-SCNC: 24 MMOL/L — SIGNIFICANT CHANGE UP (ref 17–32)
CREAT SERPL-MCNC: 0.7 MG/DL — SIGNIFICANT CHANGE UP (ref 0.7–1.5)
EOSINOPHIL # BLD AUTO: 0.01 K/UL — SIGNIFICANT CHANGE UP (ref 0–0.7)
EOSINOPHIL NFR BLD AUTO: 0.2 % — SIGNIFICANT CHANGE UP (ref 0–8)
GLUCOSE SERPL-MCNC: 116 MG/DL — HIGH (ref 70–99)
HCT VFR BLD CALC: 39 % — LOW (ref 42–52)
HGB BLD-MCNC: 13.9 G/DL — LOW (ref 14–18)
IMM GRANULOCYTES NFR BLD AUTO: 0.5 % — HIGH (ref 0.1–0.3)
LYMPHOCYTES # BLD AUTO: 0.47 K/UL — LOW (ref 1.2–3.4)
LYMPHOCYTES # BLD AUTO: 7.2 % — LOW (ref 20.5–51.1)
MCHC RBC-ENTMCNC: 34.4 PG — HIGH (ref 27–31)
MCHC RBC-ENTMCNC: 35.6 G/DL — SIGNIFICANT CHANGE UP (ref 32–37)
MCV RBC AUTO: 96.5 FL — HIGH (ref 80–94)
MONOCYTES # BLD AUTO: 0.37 K/UL — SIGNIFICANT CHANGE UP (ref 0.1–0.6)
MONOCYTES NFR BLD AUTO: 5.7 % — SIGNIFICANT CHANGE UP (ref 1.7–9.3)
NEUTROPHILS # BLD AUTO: 5.6 K/UL — SIGNIFICANT CHANGE UP (ref 1.4–6.5)
NEUTROPHILS NFR BLD AUTO: 86.2 % — HIGH (ref 42.2–75.2)
NRBC # BLD: 0 /100 WBCS — SIGNIFICANT CHANGE UP (ref 0–0)
PLATELET # BLD AUTO: 230 K/UL — SIGNIFICANT CHANGE UP (ref 130–400)
POTASSIUM SERPL-MCNC: 4.2 MMOL/L — SIGNIFICANT CHANGE UP (ref 3.5–5)
POTASSIUM SERPL-SCNC: 4.2 MMOL/L — SIGNIFICANT CHANGE UP (ref 3.5–5)
RBC # BLD: 4.04 M/UL — LOW (ref 4.7–6.1)
RBC # FLD: 11.6 % — SIGNIFICANT CHANGE UP (ref 11.5–14.5)
SODIUM SERPL-SCNC: 132 MMOL/L — LOW (ref 135–146)
WBC # BLD: 6.49 K/UL — SIGNIFICANT CHANGE UP (ref 4.8–10.8)
WBC # FLD AUTO: 6.49 K/UL — SIGNIFICANT CHANGE UP (ref 4.8–10.8)

## 2020-04-11 PROCEDURE — 99239 HOSP IP/OBS DSCHRG MGMT >30: CPT

## 2020-04-11 RX ADMIN — PANTOPRAZOLE SODIUM 40 MILLIGRAM(S): 20 TABLET, DELAYED RELEASE ORAL at 05:34

## 2020-04-11 RX ADMIN — ENOXAPARIN SODIUM 40 MILLIGRAM(S): 100 INJECTION SUBCUTANEOUS at 21:14

## 2020-04-11 RX ADMIN — Medication 650 MILLIGRAM(S): at 14:39

## 2020-04-11 NOTE — PROGRESS NOTE ADULT - ASSESSMENT
Patient is a 74 year old Male with PMH HTN comes in with Decreased PO intake and Syncope with fall. Patient reports 2 weeks no Appetite, this morning fell while walking to bathroom.     # Syncope - Likely due to hypovolemia secondary to Decreased PO intake likely secondary to Viral illness  - Afebrile, Hypotensive, Tachycardic  - No Chest pain, Palpitations, Cardiac history - Negative Trops  - EKG pending  - Na was low. Mostly secondary to dehydration. Repeat is still low but improving the patient is on NS 0.9% 100 cc/ hours  - CTH - Negative  - Resolved    # Cough   - Lab values with CXR- opacification R. Lung field and possible sick contact  - COVID +Ve    # Hyponatremia - NSS @ 100cc/ hour  # Hypokalemia - Replete 20 mg 3 doses   # HTN - BP medication were held as of Hypotension     Diet: DASH  DVT ppx: Lovenox 40   GI ppx: Protonix - Given Protonix for Decreased Appetite   Activity: Ambulate as tolerated  Dispo: Acute  FULL CODE

## 2020-04-11 NOTE — PROGRESS NOTE ADULT - SUBJECTIVE AND OBJECTIVE BOX
24H events:    Patient is a 74y old Male who presents with a chief complaint of Syncope, FAll (10 Apr 2020 12:12)    Primary diagnosis of Syncope and collapse     Today is hospital day 3d.     PAST MEDICAL & SURGICAL HISTORY  Hypertension, unspecified type  No significant past surgical history    SOCIAL HISTORY:  Negative for smoking/alcohol/drug use.     ALLERGIES:  No Known Allergies    MEDICATIONS:  STANDING MEDICATIONS  enoxaparin Injectable 40 milliGRAM(s) SubCutaneous at bedtime  pantoprazole    Tablet 40 milliGRAM(s) Oral before breakfast  sodium chloride 0.9%. 1000 milliLiter(s) IV Continuous <Continuous>    PRN MEDICATIONS  acetaminophen   Tablet .. 650 milliGRAM(s) Oral every 6 hours PRN    VITALS:   T(F): 97.8  HR: 84  BP: 127/84  RR: 18  SpO2: 94%    LABS:                        14.2   5.30  )-----------( 193      ( 10 Apr 2020 11:51 )             39.8     04-10    131<L>  |  93<L>  |  21<H>  ----------------------------<  132<H>  3.5   |  25  |  0.7    Ca    8.1<L>      10 Apr 2020 11:51    TPro  6.1  /  Alb  3.3<L>  /  TBili  1.1  /  DBili  x   /  AST  44<H>  /  ALT  52<H>  /  AlkPhos  43  04-10              Culture - Blood (collected 09 Apr 2020 06:53)  Source: .Blood None  Preliminary Report (10 Apr 2020 18:01):    No growth to date.        PHYSICAL EXAM:

## 2020-04-11 NOTE — PROGRESS NOTE ADULT - SUBJECTIVE AND OBJECTIVE BOX
Patient is a 74y old  Male who presents with a chief complaint of Syncope, FAll (09 Apr 2020 09:47)    HPI:  Patient is a 74 year old Male with PMH HTN comes in with Decreased PO intake and Syncope with fall. Patient reports two weeks of Decreased PO intake due to Decreased Appetite. Patient reports that he tries to eat things like oatmeal and banana but he cant finish it due to no appetite. He repots Malaise, weakness, and loss of weight, unable to quantify. this morning he was walking to bathroom, fell, and cut his head, not sure how +LOC. Patient reports associated fevers of "107 degrees" but that has since resolved.  He denies any Abdominal pain, Nausea, Vomiting. he does report Loose stools and attributes it to not eating much. He reports no blood, but Reports "Some Black dots" in his loose stool. No change in Urination.  He reports that his doctor prescribed Robitussin, but he has since stopped that medication.   The patient reports that he has had a very mild cough. He reports his wife has been coughing a lot, but his is only mild.    Patient reports no Travel History, And only possible sick contact with wife.     In ED PT was Afebrile, Hypotensive, and Tachycardic - Treated with 2 L IV NSS. (08 Apr 2020 15:02)    PAST MEDICAL & SURGICAL HISTORY:  Hypertension, unspecified type  No significant past surgical history    patient seen and examined independently on rounds, chart reviewed and d/w medicine resident.    no overnight events---awaiting blood work from today and if stable plan for dc home today               PE:  GEN-NAD, AAOx3  PULM- Clear to auscultation bilaterally, fair air entry  CVS- +s1/s2 RRR   GI- soft NT ND +bs, no rebound, no guarding  EXT- no edema

## 2020-04-12 LAB
ANION GAP SERPL CALC-SCNC: 11 MMOL/L — SIGNIFICANT CHANGE UP (ref 7–14)
ANION GAP SERPL CALC-SCNC: 14 MMOL/L — SIGNIFICANT CHANGE UP (ref 7–14)
BASOPHILS # BLD AUTO: 0 K/UL — SIGNIFICANT CHANGE UP (ref 0–0.2)
BASOPHILS NFR BLD AUTO: 0 % — SIGNIFICANT CHANGE UP (ref 0–1)
BUN SERPL-MCNC: 21 MG/DL — HIGH (ref 10–20)
BUN SERPL-MCNC: 22 MG/DL — HIGH (ref 10–20)
CALCIUM SERPL-MCNC: 7.9 MG/DL — LOW (ref 8.5–10.1)
CALCIUM SERPL-MCNC: 8 MG/DL — LOW (ref 8.5–10.1)
CHLORIDE SERPL-SCNC: 96 MMOL/L — LOW (ref 98–110)
CHLORIDE SERPL-SCNC: 97 MMOL/L — LOW (ref 98–110)
CO2 SERPL-SCNC: 26 MMOL/L — SIGNIFICANT CHANGE UP (ref 17–32)
CO2 SERPL-SCNC: 27 MMOL/L — SIGNIFICANT CHANGE UP (ref 17–32)
CREAT SERPL-MCNC: 0.6 MG/DL — LOW (ref 0.7–1.5)
CREAT SERPL-MCNC: 0.7 MG/DL — SIGNIFICANT CHANGE UP (ref 0.7–1.5)
D DIMER BLD IA.RAPID-MCNC: 429 NG/ML DDU — HIGH (ref 0–230)
EOSINOPHIL # BLD AUTO: 0.01 K/UL — SIGNIFICANT CHANGE UP (ref 0–0.7)
EOSINOPHIL NFR BLD AUTO: 0.2 % — SIGNIFICANT CHANGE UP (ref 0–8)
GLUCOSE SERPL-MCNC: 142 MG/DL — HIGH (ref 70–99)
GLUCOSE SERPL-MCNC: 155 MG/DL — HIGH (ref 70–99)
HCT VFR BLD CALC: 38.2 % — LOW (ref 42–52)
HGB BLD-MCNC: 13.3 G/DL — LOW (ref 14–18)
IMM GRANULOCYTES NFR BLD AUTO: 0.5 % — HIGH (ref 0.1–0.3)
LYMPHOCYTES # BLD AUTO: 0.51 K/UL — LOW (ref 1.2–3.4)
LYMPHOCYTES # BLD AUTO: 8.6 % — LOW (ref 20.5–51.1)
MAGNESIUM SERPL-MCNC: 2.2 MG/DL — SIGNIFICANT CHANGE UP (ref 1.8–2.4)
MCHC RBC-ENTMCNC: 33.9 PG — HIGH (ref 27–31)
MCHC RBC-ENTMCNC: 34.8 G/DL — SIGNIFICANT CHANGE UP (ref 32–37)
MCV RBC AUTO: 97.4 FL — HIGH (ref 80–94)
MONOCYTES # BLD AUTO: 0.31 K/UL — SIGNIFICANT CHANGE UP (ref 0.1–0.6)
MONOCYTES NFR BLD AUTO: 5.2 % — SIGNIFICANT CHANGE UP (ref 1.7–9.3)
NEUTROPHILS # BLD AUTO: 5.1 K/UL — SIGNIFICANT CHANGE UP (ref 1.4–6.5)
NEUTROPHILS NFR BLD AUTO: 85.5 % — HIGH (ref 42.2–75.2)
NRBC # BLD: 0 /100 WBCS — SIGNIFICANT CHANGE UP (ref 0–0)
PLATELET # BLD AUTO: 276 K/UL — SIGNIFICANT CHANGE UP (ref 130–400)
POTASSIUM SERPL-MCNC: 3.3 MMOL/L — LOW (ref 3.5–5)
POTASSIUM SERPL-MCNC: 3.9 MMOL/L — SIGNIFICANT CHANGE UP (ref 3.5–5)
POTASSIUM SERPL-SCNC: 3.3 MMOL/L — LOW (ref 3.5–5)
POTASSIUM SERPL-SCNC: 3.9 MMOL/L — SIGNIFICANT CHANGE UP (ref 3.5–5)
RBC # BLD: 3.92 M/UL — LOW (ref 4.7–6.1)
RBC # FLD: 11.3 % — LOW (ref 11.5–14.5)
SODIUM SERPL-SCNC: 134 MMOL/L — LOW (ref 135–146)
SODIUM SERPL-SCNC: 137 MMOL/L — SIGNIFICANT CHANGE UP (ref 135–146)
WBC # BLD: 5.96 K/UL — SIGNIFICANT CHANGE UP (ref 4.8–10.8)
WBC # FLD AUTO: 5.96 K/UL — SIGNIFICANT CHANGE UP (ref 4.8–10.8)

## 2020-04-12 PROCEDURE — 71045 X-RAY EXAM CHEST 1 VIEW: CPT | Mod: 26

## 2020-04-12 PROCEDURE — 99233 SBSQ HOSP IP/OBS HIGH 50: CPT

## 2020-04-12 RX ORDER — HYDROXYCHLOROQUINE SULFATE 200 MG
TABLET ORAL
Refills: 0 | Status: DISCONTINUED | OUTPATIENT
Start: 2020-04-12 | End: 2020-04-15

## 2020-04-12 RX ORDER — HYDROXYCHLOROQUINE SULFATE 200 MG
400 TABLET ORAL EVERY 12 HOURS
Refills: 0 | Status: COMPLETED | OUTPATIENT
Start: 2020-04-12 | End: 2020-04-12

## 2020-04-12 RX ORDER — HYDROXYCHLOROQUINE SULFATE 200 MG
200 TABLET ORAL EVERY 12 HOURS
Refills: 0 | Status: DISCONTINUED | OUTPATIENT
Start: 2020-04-13 | End: 2020-04-15

## 2020-04-12 RX ORDER — POTASSIUM CHLORIDE 20 MEQ
40 PACKET (EA) ORAL ONCE
Refills: 0 | Status: COMPLETED | OUTPATIENT
Start: 2020-04-12 | End: 2020-04-12

## 2020-04-12 RX ORDER — ZINC SULFATE TAB 220 MG (50 MG ZINC EQUIVALENT) 220 (50 ZN) MG
220 TAB ORAL DAILY
Refills: 0 | Status: DISCONTINUED | OUTPATIENT
Start: 2020-04-12 | End: 2020-04-15

## 2020-04-12 RX ADMIN — Medication 400 MILLIGRAM(S): at 23:50

## 2020-04-12 RX ADMIN — PANTOPRAZOLE SODIUM 40 MILLIGRAM(S): 20 TABLET, DELAYED RELEASE ORAL at 06:03

## 2020-04-12 RX ADMIN — Medication 650 MILLIGRAM(S): at 13:29

## 2020-04-12 RX ADMIN — Medication 400 MILLIGRAM(S): at 13:27

## 2020-04-12 RX ADMIN — ENOXAPARIN SODIUM 40 MILLIGRAM(S): 100 INJECTION SUBCUTANEOUS at 21:55

## 2020-04-12 RX ADMIN — ZINC SULFATE TAB 220 MG (50 MG ZINC EQUIVALENT) 220 MILLIGRAM(S): 220 (50 ZN) TAB at 13:25

## 2020-04-12 RX ADMIN — Medication 40 MILLIEQUIVALENT(S): at 13:25

## 2020-04-12 NOTE — PHYSICAL THERAPY INITIAL EVALUATION ADULT - STRENGTHENING, PT EVAL
Pt educated on heel slides x30 and encouraged to do daily to improve sit<->stand transfers and ambulation.

## 2020-04-12 NOTE — PROGRESS NOTE ADULT - ASSESSMENT
Discharge on hold 2/2 new onset hypoxia with ambulation today and needing o2 suppl--concern for progression into inflammatory/cytokine storm    a/p:  #syncope- 2/2 dehydration/hypovolemia- +Covid viral illness-acute hypoxic resp failure (with ambulation)  -CT head negative  -COVID19+   -will repeat procalcitonin, ddimer, crp, fibrinogen and cxr today  -cont o2 suppl (goal o2 sat >92%)---will need to repeat o2 sat with ambulation in am  -continue isolation precautions  -will start  mg bid x 1 day and then 200 mg bid x 4 days---monitor QTc   -start zinc  -encourage oral intake---hyponatremia resolved (Na up to 137 today)---suppl K (hypokalemia 3.3 today)    #HTN  -bp stable off anti-hypertensives    #DVT/GI ppx  guarded prognosis    #Progress Note Handoff  -discharge on hold 2/2 desaturation this morning with ambulation---cont to monitor closely and will repeat cxr and inflammatory markers to evaluate for progression into cytokine storm/acute inflammatory phase of covid.    if clinically stabilizes within the next 24-48 hrs then anticipate possible dc home (patients brother in law will be able to stay with patient) as wife is admitted to [facility] for acute hypoxic respiratory failure 2/2 COVID-19 viral illness

## 2020-04-12 NOTE — PROGRESS NOTE ADULT - SUBJECTIVE AND OBJECTIVE BOX
Patient is a 74y old  Male who presents with a chief complaint of Syncope, FAll (09 Apr 2020 09:47)    HPI:  Patient is a 74 year old Male with PMH HTN comes in with Decreased PO intake and Syncope with fall. Patient reports two weeks of Decreased PO intake due to Decreased Appetite. Patient reports that he tries to eat things like oatmeal and banana but he cant finish it due to no appetite. He repots Malaise, weakness, and loss of weight, unable to quantify. this morning he was walking to bathroom, fell, and cut his head, not sure how +LOC. Patient reports associated fevers of "107 degrees" but that has since resolved.  He denies any Abdominal pain, Nausea, Vomiting. he does report Loose stools and attributes it to not eating much. He reports no blood, but Reports "Some Black dots" in his loose stool. No change in Urination.  He reports that his doctor prescribed Robitussin, but he has since stopped that medication.   The patient reports that he has had a very mild cough. He reports his wife has been coughing a lot, but his is only mild.    Patient reports no Travel History, And only possible sick contact with wife.     In ED PT was Afebrile, Hypotensive, and Tachycardic - Treated with 2 L IV NSS. (08 Apr 2020 15:02)    PAST MEDICAL & SURGICAL HISTORY:  Hypertension, unspecified type  No significant past surgical history    patient seen and examined independently on rounds, chart reviewed and d/w medicine resident on call.    this morning desaturated with ambulation (o2sat down to 86-87% on RA) after minimally working with PT- place on 2 L NC- discharge on hold 2/2 new onset hypoxia- patient also states he continues to feel weak (concern for developing into covid 19 inflammatory phase)               PE:  GEN-NAD, AAOx3-on 2L o2 via NC  PULM-  fair air entry bilateral chest expansion  CVS- +s1/s2 RRR   GI- soft NT ND +bs, no rebound, no guarding  EXT- no edema         Labs:                        13.3   5.96  )-----------( 276      ( 12 Apr 2020 07:14 )             38.2     CBC Full  -  ( 12 Apr 2020 07:14 )  WBC Count : 5.96 K/uL  RBC Count : 3.92 M/uL  Hemoglobin : 13.3 g/dL  Hematocrit : 38.2 %  Platelet Count - Automated : 276 K/uL  Mean Cell Volume : 97.4 fL  Mean Cell Hemoglobin : 33.9 pg  Mean Cell Hemoglobin Concentration : 34.8 g/dL  Auto Neutrophil # : 5.10 K/uL  Auto Lymphocyte # : 0.51 K/uL  Auto Monocyte # : 0.31 K/uL  Auto Eosinophil # : 0.01 K/uL  Auto Basophil # : 0.00 K/uL  Auto Neutrophil % : 85.5 %  Auto Lymphocyte % : 8.6 %  Auto Monocyte % : 5.2 %  Auto Eosinophil % : 0.2 %  Auto Basophil % : 0.0 %      04-12    137  |  96<L>  |  22<H>  ----------------------------<  155<H>  3.3<L>   |  27  |  0.7    Ca    8.0<L>      12 Apr 2020 07:14    TPro  6.1  /  Alb  3.3<L>  /  TBili  1.1  /  DBili  x   /  AST  44<H>  /  ALT  52<H>  /  AlkPhos  43  04-10      Microbiology:      Vital Signs Last 24 Hrs  T(C): 36.3 (12 Apr 2020 06:30), Max: 37.8 (11 Apr 2020 13:33)  T(F): 97.3 (12 Apr 2020 06:30), Max: 100 (11 Apr 2020 13:33)  HR: 88 (12 Apr 2020 06:30) (84 - 88)  BP: 119/75 (12 Apr 2020 06:30) (119/75 - 134/76)  BP(mean): --  RR: 20 (12 Apr 2020 06:30) (18 - 20)  SpO2: 88% (12 Apr 2020 10:34) (88% - 99%)    I&O's Summary    11 Apr 2020 07:01  -  12 Apr 2020 07:00  --------------------------------------------------------  IN: 50 mL / OUT: 250 mL / NET: -200 mL

## 2020-04-12 NOTE — PHYSICAL THERAPY INITIAL EVALUATION ADULT - GENERAL OBSERVATIONS, REHAB EVAL
PT IE 10-10:30am. Pt supine in NAD. +call bell, +bed alarm, no c/o pain. Pt presented with 87% SPO2 after ambulation. RN aware. Pt placed on O2 2L via nasal cannula.

## 2020-04-13 LAB
ANION GAP SERPL CALC-SCNC: 10 MMOL/L — SIGNIFICANT CHANGE UP (ref 7–14)
BUN SERPL-MCNC: 20 MG/DL — SIGNIFICANT CHANGE UP (ref 10–20)
CALCIUM SERPL-MCNC: 8 MG/DL — LOW (ref 8.5–10.1)
CHLORIDE SERPL-SCNC: 98 MMOL/L — SIGNIFICANT CHANGE UP (ref 98–110)
CO2 SERPL-SCNC: 26 MMOL/L — SIGNIFICANT CHANGE UP (ref 17–32)
CREAT SERPL-MCNC: 0.6 MG/DL — LOW (ref 0.7–1.5)
CRP SERPL-MCNC: 11.03 MG/DL — HIGH (ref 0–0.4)
CULTURE RESULTS: SIGNIFICANT CHANGE UP
GLUCOSE SERPL-MCNC: 142 MG/DL — HIGH (ref 70–99)
HCT VFR BLD CALC: 35.5 % — LOW (ref 42–52)
HGB BLD-MCNC: 12.7 G/DL — LOW (ref 14–18)
MCHC RBC-ENTMCNC: 34.6 PG — HIGH (ref 27–31)
MCHC RBC-ENTMCNC: 35.8 G/DL — SIGNIFICANT CHANGE UP (ref 32–37)
MCV RBC AUTO: 96.7 FL — HIGH (ref 80–94)
NRBC # BLD: 0 /100 WBCS — SIGNIFICANT CHANGE UP (ref 0–0)
PLATELET # BLD AUTO: 293 K/UL — SIGNIFICANT CHANGE UP (ref 130–400)
POTASSIUM SERPL-MCNC: 4 MMOL/L — SIGNIFICANT CHANGE UP (ref 3.5–5)
POTASSIUM SERPL-SCNC: 4 MMOL/L — SIGNIFICANT CHANGE UP (ref 3.5–5)
PROCALCITONIN SERPL-MCNC: 0.12 NG/ML — HIGH (ref 0.02–0.1)
RBC # BLD: 3.67 M/UL — LOW (ref 4.7–6.1)
RBC # FLD: 11.6 % — SIGNIFICANT CHANGE UP (ref 11.5–14.5)
SODIUM SERPL-SCNC: 134 MMOL/L — LOW (ref 135–146)
SPECIMEN SOURCE: SIGNIFICANT CHANGE UP
WBC # BLD: 5.93 K/UL — SIGNIFICANT CHANGE UP (ref 4.8–10.8)
WBC # FLD AUTO: 5.93 K/UL — SIGNIFICANT CHANGE UP (ref 4.8–10.8)

## 2020-04-13 PROCEDURE — 99232 SBSQ HOSP IP/OBS MODERATE 35: CPT

## 2020-04-13 RX ORDER — HYDROXYCHLOROQUINE SULFATE 200 MG
1 TABLET ORAL
Qty: 5 | Refills: 0
Start: 2020-04-13

## 2020-04-13 RX ADMIN — PANTOPRAZOLE SODIUM 40 MILLIGRAM(S): 20 TABLET, DELAYED RELEASE ORAL at 06:08

## 2020-04-13 RX ADMIN — ZINC SULFATE TAB 220 MG (50 MG ZINC EQUIVALENT) 220 MILLIGRAM(S): 220 (50 ZN) TAB at 12:31

## 2020-04-13 RX ADMIN — Medication 200 MILLIGRAM(S): at 22:33

## 2020-04-13 RX ADMIN — ENOXAPARIN SODIUM 40 MILLIGRAM(S): 100 INJECTION SUBCUTANEOUS at 22:33

## 2020-04-13 RX ADMIN — Medication 200 MILLIGRAM(S): at 12:31

## 2020-04-13 NOTE — PROGRESS NOTE ADULT - ASSESSMENT
Patient is a 74 year old Male with PMH HTN comes in with Decreased PO intake and Syncope with fall. Patient reports 2 weeks no Appetite, this morning fell while walking to bathroom.     # Syncope - Likely due to hypovolemia secondary to Decreased PO intake likely secondary to Viral illness  - Afebrile, Hypotensive, Tachycardic  - No Chest pain, Palpitations, Cardiac history - Negative Trops  - EKG pending  - Na was low. Mostly secondary to dehydration. Repeat is still low but improving the patient is on NS 0.9% 100 cc/ hours  - CTH - Negative  - Resolved    # Cough   - Lab values with CXR- opacification R. Lung field and possible sick contact  - COVID +Ve  -Spo2 drops on exertion. Will need to be sent home on O2    # Hyponatremia - NSS @ 100cc/ hour  # Hypokalemia - Replete 20 mg 3 doses   # HTN - BP medication were held as of Hypotension     Diet: DASH  DVT ppx: Lovenox 40   GI ppx: Protonix - Given Protonix for Decreased Appetite   Activity: Ambulate as tolerated  Dispo: Will be discharged home in the AM   FULL CODE Patient is a 74 year old Male with PMH HTN comes in with Decreased PO intake and Syncope with fall. Patient reports 2 weeks no Appetite, this morning fell while walking to bathroom.     # Syncope - Likely due to hypovolemia secondary to Decreased PO intake likely secondary to Viral illness  - Afebrile, Hypotensive, Tachycardic  - No Chest pain, Palpitations, Cardiac history - Negative Trops  - Na was low. Mostly secondary to dehydration. Repeat is still low but improving the patient is on NS 0.9% 100 cc/ hours  - CTH - Negative  - Resolved    # Cough   - Lab values with CXR- opacification R. Lung field and possible sick contact  - COVID +Ve  -Spo2 drops on exertion. Will need to be sent home on O2    # Hyponatremia - NSS @ 100cc/ hour  # Hypokalemia - Replete 20 mg 3 doses   # HTN - BP medication were held as of Hypotension     Diet: DASH  DVT ppx: Lovenox 40   GI ppx: Protonix - Given Protonix for Decreased Appetite   Activity: Ambulate as tolerated  Dispo: Will be discharged home in the AM   FULL CODE Patient is a 74 year old Male with PMH HTN comes in with Decreased PO intake and Syncope with fall. Patient reports 2 weeks no Appetite, this morning fell while walking to bathroom.     # Syncope - Likely due to hypovolemia secondary to Decreased PO intake likely secondary to Viral illness  - Afebrile, Hypotensive, Tachycardic  - No Chest pain, Palpitations, Cardiac history - Negative Trops  - Na was low. Mostly secondary to dehydration. Repeat is still low but improving the patient is on NS 0.9% 100 cc/ hours  - CTH - Negative  - Resolved    # Cough   - Lab values with CXR- opacification R. Lung field and possible sick contact  - COVID +Ve  -Spo2 drops on exertion. Will need to be sent home on O2    # Hyponatremia - NSS @ 100cc/ hour  # Hypokalemia - Replete 20 mg 3 doses   # HTN - BP medication were held as of Hypotension     The patient saturating well on 2 L NC, however the patient drops to 85-86% on ambulation, ever minimal ambulation. will need to be discharged on O2.   Diet: DASH  DVT ppx: Lovenox 40   GI ppx: Protonix - Given Protonix for Decreased Appetite   Activity: Ambulate as tolerated  Dispo: Will be discharged home in the AM   FULL CODE Patient is a 74 year old Male with PMH HTN comes in with Decreased PO intake and Syncope with fall. Patient reports 2 weeks no Appetite, this morning fell while walking to bathroom.     # Syncope - Likely due to hypovolemia secondary to Decreased PO intake likely secondary to Viral illness  - Afebrile, Hypotensive, Tachycardic  - No Chest pain, Palpitations, Cardiac history - Negative Trops  - Na was low. Mostly secondary to dehydration. Repeat is still low but improving the patient is on NS 0.9% 100 cc/ hours  - CTH - Negative  - Resolved    # Cough   - Lab values with CXR- opacification R. Lung field and possible sick contact  - COVID +Ve  -Spo2 drops on exertion. Will need to be sent home on O2    # Hyponatremia - NSS @ 100cc/ hour  # Hypokalemia - Replete 20 mg 3 doses   # HTN - BP medication were held as of Hypotension     The patient saturating well on 2 L NC, however the patient drops to 85-86% on ambulation, even minimal ambulation which improve rapidly to 94-95% on 2 L NC. will need to be discharged on O2.   Diet: DASH  DVT ppx: Lovenox 40   GI ppx: Protonix - Given Protonix for Decreased Appetite   Activity: Ambulate as tolerated  Dispo: Will be discharged home in the AM   FULL CODE

## 2020-04-13 NOTE — PROGRESS NOTE ADULT - SUBJECTIVE AND OBJECTIVE BOX
24H events:    Patient is a 74y old Male who presents with a chief complaint of Syncope, FAll (12 Apr 2020 11:15)    Primary diagnosis of Syncope and collapse     Today is hospital day 5d.     PAST MEDICAL & SURGICAL HISTORY  Hypertension, unspecified type  No significant past surgical history    SOCIAL HISTORY:  Negative for smoking/alcohol/drug use.     ALLERGIES:  No Known Allergies    MEDICATIONS:  STANDING MEDICATIONS  enoxaparin Injectable 40 milliGRAM(s) SubCutaneous at bedtime  hydroxychloroquine   Oral   hydroxychloroquine 200 milliGRAM(s) Oral every 12 hours  pantoprazole    Tablet 40 milliGRAM(s) Oral before breakfast  sodium chloride 0.9%. 1000 milliLiter(s) IV Continuous <Continuous>  zinc sulfate 220 milliGRAM(s) Oral daily    PRN MEDICATIONS  acetaminophen   Tablet .. 650 milliGRAM(s) Oral every 6 hours PRN    VITALS:   T(F): 99  HR: 88  BP: 125/83  RR: 20  SpO2: 97%    LABS:                        12.7   5.93  )-----------( 293      ( 13 Apr 2020 09:07 )             35.5     04-13    134<L>  |  98  |  20  ----------------------------<  142<H>  4.0   |  26  |  0.6<L>    Ca    8.0<L>      13 Apr 2020 09:07  Mg     2.2     04-12                    PHYSICAL EXAM: 24H events:    Patient is a 74y old Male who presents with a chief complaint of Syncope, FAll (12 Apr 2020 11:15)    Primary diagnosis of Syncope and collapse     Today is hospital day 5d.   interval events: patient is comfortable on NC. no symptoms    PAST MEDICAL & SURGICAL HISTORY  Hypertension, unspecified type  No significant past surgical history    SOCIAL HISTORY:  Negative for smoking/alcohol/drug use.     ALLERGIES:  No Known Allergies    MEDICATIONS:  STANDING MEDICATIONS  enoxaparin Injectable 40 milliGRAM(s) SubCutaneous at bedtime  hydroxychloroquine   Oral   hydroxychloroquine 200 milliGRAM(s) Oral every 12 hours  pantoprazole    Tablet 40 milliGRAM(s) Oral before breakfast  sodium chloride 0.9%. 1000 milliLiter(s) IV Continuous <Continuous>  zinc sulfate 220 milliGRAM(s) Oral daily    PRN MEDICATIONS  acetaminophen   Tablet .. 650 milliGRAM(s) Oral every 6 hours PRN    VITALS:   T(F): 99  HR: 88  BP: 125/83  RR: 20  SpO2: 97%    Physical exam  calm  NAD  anicteric  S1 S2  Rhonchi  s/nt/ng  no jaundice  no tremor    LABS:                        12.7   5.93  )-----------( 293      ( 13 Apr 2020 09:07 )             35.5     04-13    134<L>  |  98  |  20  ----------------------------<  142<H>  4.0   |  26  |  0.6<L>    Ca    8.0<L>      13 Apr 2020 09:07  Mg     2.2     04-12

## 2020-04-14 RX ADMIN — PANTOPRAZOLE SODIUM 40 MILLIGRAM(S): 20 TABLET, DELAYED RELEASE ORAL at 05:24

## 2020-04-14 RX ADMIN — Medication 200 MILLIGRAM(S): at 22:09

## 2020-04-14 RX ADMIN — Medication 200 MILLIGRAM(S): at 11:34

## 2020-04-14 RX ADMIN — ENOXAPARIN SODIUM 40 MILLIGRAM(S): 100 INJECTION SUBCUTANEOUS at 22:09

## 2020-04-14 RX ADMIN — ZINC SULFATE TAB 220 MG (50 MG ZINC EQUIVALENT) 220 MILLIGRAM(S): 220 (50 ZN) TAB at 11:34

## 2020-04-14 NOTE — DISCHARGE NOTE NURSING/CASE MANAGEMENT/SOCIAL WORK - PATIENT PORTAL LINK FT
You can access the FollowMyHealth Patient Portal offered by [facility] by registering at the following website: [email or web address]. By joining [facility]’s FollowMyHealth portal, you will also be able to view your health information using other applications (apps) compatible with our system.

## 2020-04-14 NOTE — DISCHARGE NOTE NURSING/CASE MANAGEMENT/SOCIAL WORK - NSDPDISTO_GEN_ALL_CORE
03/22/21    [address] [place] [id number]      Dear Mac Mas,    Our records indicate that you have outstanding lab work and or testing that was ordered for you and has not yet been completed:  [facility]

## 2020-04-14 NOTE — DISCHARGE NOTE NURSING/CASE MANAGEMENT/SOCIAL WORK - NSDCPEPT PROEDMA_GEN_ALL_CORE
Today the [facility] data was reviewed by myself and there is no evidence of aberrant behavior.       Yes

## 2020-04-15 VITALS
HEART RATE: 83 BPM | DIASTOLIC BLOOD PRESSURE: 80 MMHG | RESPIRATION RATE: 18 BRPM | TEMPERATURE: 97 F | SYSTOLIC BLOOD PRESSURE: 138 MMHG

## 2020-04-15 PROCEDURE — 99239 HOSP IP/OBS DSCHRG MGMT >30: CPT

## 2020-04-15 RX ORDER — HYDROXYCHLOROQUINE SULFATE 200 MG
1 TABLET ORAL
Qty: 3 | Refills: 0
Start: 2020-04-15

## 2020-04-15 RX ADMIN — PANTOPRAZOLE SODIUM 40 MILLIGRAM(S): 20 TABLET, DELAYED RELEASE ORAL at 05:52

## 2020-04-15 RX ADMIN — Medication 200 MILLIGRAM(S): at 11:18

## 2020-04-15 RX ADMIN — ZINC SULFATE TAB 220 MG (50 MG ZINC EQUIVALENT) 220 MILLIGRAM(S): 220 (50 ZN) TAB at 11:18

## 2020-04-16 LAB — FIBRINOGEN AG PPP IA-MCNC: 557 MG/DL — HIGH

## 2020-04-17 DIAGNOSIS — U07.1 COVID-19: ICD-10-CM

## 2020-04-17 DIAGNOSIS — E87.1 HYPO-OSMOLALITY AND HYPONATREMIA: ICD-10-CM

## 2020-04-17 DIAGNOSIS — E46 UNSPECIFIED PROTEIN-CALORIE MALNUTRITION: ICD-10-CM

## 2020-04-17 DIAGNOSIS — D72.810 LYMPHOCYTOPENIA: ICD-10-CM

## 2020-04-17 DIAGNOSIS — I95.89 OTHER HYPOTENSION: ICD-10-CM

## 2020-04-17 DIAGNOSIS — R00.0 TACHYCARDIA, UNSPECIFIED: ICD-10-CM

## 2020-04-17 DIAGNOSIS — R55 SYNCOPE AND COLLAPSE: ICD-10-CM

## 2020-04-17 DIAGNOSIS — E87.6 HYPOKALEMIA: ICD-10-CM

## 2020-04-17 DIAGNOSIS — J96.01 ACUTE RESPIRATORY FAILURE WITH HYPOXIA: ICD-10-CM

## 2020-04-17 DIAGNOSIS — R05 COUGH: ICD-10-CM

## 2020-04-17 DIAGNOSIS — E86.0 DEHYDRATION: ICD-10-CM

## 2020-04-17 DIAGNOSIS — R94.31 ABNORMAL ELECTROCARDIOGRAM [ECG] [EKG]: ICD-10-CM

## 2020-04-17 DIAGNOSIS — E86.1 HYPOVOLEMIA: ICD-10-CM

## 2020-04-17 DIAGNOSIS — I10 ESSENTIAL (PRIMARY) HYPERTENSION: ICD-10-CM

## 2020-04-17 DIAGNOSIS — D69.59 OTHER SECONDARY THROMBOCYTOPENIA: ICD-10-CM

## 2020-04-17 DIAGNOSIS — R74.0 NONSPECIFIC ELEVATION OF LEVELS OF TRANSAMINASE AND LACTIC ACID DEHYDROGENASE [LDH]: ICD-10-CM

## 2020-04-17 DIAGNOSIS — R53.1 WEAKNESS: ICD-10-CM

## 2024-04-11 NOTE — PROGRESS NOTE ADULT - ASSESSMENT
Done tt mom   time spendt on discharge >30 minutes including coordination of discharge care    discharge home today- plan for f/u with outpatient pcp, continue with 14 day strict isolation/self quarentine at home- advised to f/u and return to ED/pcp should he develop any worsening symptoms    a/p:  #syncope- 2/2 dehydration/hypovolemia- +Covid viral illness- stabilized  -CT head negative  -COVID19+   -monitor bmp (Na 131 yesterday)---off ivf and tolerating oral intake- repeat bmp pending today --if Na and labs ok plan for dc home  -continue isolation precautions  -crp 6.96/ procalc 0.07  -no oxygen needs---remains on RA and o2 sat 96-98%  -QTc >500---was not started on HCQ as no clinical signs of respiratory distress---if any change clinically     #HTN  -bp stable off anti-hypertensives    #DVT/GI ppx  guarded prognosis    #Progress Note Handoff  -plan for discharge home today---f/u with case managment if any home needs (patient lives with wife and she is currently admitted to [facility])
